# Patient Record
Sex: MALE | Race: WHITE | Employment: OTHER | URBAN - METROPOLITAN AREA
[De-identification: names, ages, dates, MRNs, and addresses within clinical notes are randomized per-mention and may not be internally consistent; named-entity substitution may affect disease eponyms.]

---

## 2023-09-18 ENCOUNTER — APPOINTMENT (OUTPATIENT)
Dept: CT IMAGING | Facility: HOSPITAL | Age: 78
End: 2023-09-18
Attending: EMERGENCY MEDICINE
Payer: MEDICARE

## 2023-09-18 ENCOUNTER — HOSPITAL ENCOUNTER (EMERGENCY)
Facility: HOSPITAL | Age: 78
Discharge: HOME | End: 2023-09-18
Attending: EMERGENCY MEDICINE
Payer: MEDICARE

## 2023-09-18 VITALS
OXYGEN SATURATION: 96 % | HEIGHT: 75 IN | RESPIRATION RATE: 16 BRPM | TEMPERATURE: 98 F | SYSTOLIC BLOOD PRESSURE: 136 MMHG | BODY MASS INDEX: 27.1 KG/M2 | HEART RATE: 72 BPM | DIASTOLIC BLOOD PRESSURE: 67 MMHG | WEIGHT: 218 LBS

## 2023-09-18 DIAGNOSIS — S09.8XXA BLUNT HEAD TRAUMA, INITIAL ENCOUNTER: Primary | ICD-10-CM

## 2023-09-18 PROCEDURE — 70450 CT HEAD/BRAIN W/O DYE: CPT | Performed by: EMERGENCY MEDICINE

## 2023-09-18 PROCEDURE — 99284 EMERGENCY DEPT VISIT MOD MDM: CPT

## 2023-09-18 RX ORDER — SENNOSIDES 8.6 MG
8.6 TABLET ORAL DAILY
COMMUNITY

## 2023-09-18 RX ORDER — POLYETHYLENE GLYCOL 3350 17 G/17G
17 POWDER, FOR SOLUTION ORAL DAILY
COMMUNITY

## 2023-09-18 RX ORDER — GABAPENTIN 600 MG/1
600 TABLET ORAL NIGHTLY
COMMUNITY

## 2023-09-18 RX ORDER — BUTALBITAL, ASPIRIN, AND CAFFEINE 325; 50; 40 MG/1; MG/1; MG/1
1 CAPSULE ORAL EVERY 6 HOURS PRN
COMMUNITY

## 2023-09-18 RX ORDER — METOPROLOL SUCCINATE 25 MG/1
25 TABLET, EXTENDED RELEASE ORAL DAILY
COMMUNITY

## 2023-09-18 RX ORDER — ROSUVASTATIN CALCIUM 10 MG/1
10 TABLET, COATED ORAL NIGHTLY
COMMUNITY

## 2023-09-18 RX ORDER — ZOLPIDEM TARTRATE 10 MG/1
10 TABLET ORAL NIGHTLY PRN
COMMUNITY

## 2023-09-18 RX ORDER — GLIPIZIDE 5 MG/1
2 TABLET ORAL 2 TIMES DAILY
COMMUNITY

## 2023-09-18 RX ORDER — LISINOPRIL 10 MG/1
10 TABLET ORAL DAILY
COMMUNITY

## 2023-09-18 RX ORDER — MECLIZINE HYDROCHLORIDE 25 MG/1
25 TABLET ORAL 3 TIMES DAILY PRN
COMMUNITY

## 2023-09-18 NOTE — ED QUICK NOTES
Pt reevaluated by er physician. Informed of his ct report and plan of care.  Verbalizing understanding

## 2023-09-18 NOTE — ED INITIAL ASSESSMENT (HPI)
Pt came from [employer] he was about to take a shower and was sitting on the chair in the shower room and fell hitting right side of head on the floor. denies loc

## 2023-09-18 NOTE — ED QUICK NOTES
[facility] CALLED FOR [address] TRANSPORT BACK TO [facility] [place], ETA: [id number].

## 2023-09-18 NOTE — DISCHARGE INSTRUCTIONS
Take 1000 mg acetaminophen (2 Tylenol tablets) every 6 hours as needed for headache    Use your walker to ambulate at all times.